# Patient Record
Sex: MALE | Race: OTHER | HISPANIC OR LATINO | URBAN - METROPOLITAN AREA
[De-identification: names, ages, dates, MRNs, and addresses within clinical notes are randomized per-mention and may not be internally consistent; named-entity substitution may affect disease eponyms.]

---

## 2020-06-24 ENCOUNTER — EMERGENCY (EMERGENCY)
Facility: HOSPITAL | Age: 25
LOS: 0 days | Discharge: HOME | End: 2020-06-24
Attending: EMERGENCY MEDICINE | Admitting: EMERGENCY MEDICINE
Payer: SUBSIDIZED

## 2020-06-24 VITALS — SYSTOLIC BLOOD PRESSURE: 144 MMHG | TEMPERATURE: 98 F | DIASTOLIC BLOOD PRESSURE: 73 MMHG | HEART RATE: 76 BPM

## 2020-06-24 VITALS
DIASTOLIC BLOOD PRESSURE: 80 MMHG | OXYGEN SATURATION: 100 % | HEART RATE: 78 BPM | TEMPERATURE: 98 F | SYSTOLIC BLOOD PRESSURE: 125 MMHG | RESPIRATION RATE: 18 BRPM
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
## 2020-06-24 DIAGNOSIS — S61.512A LACERATION WITHOUT FOREIGN BODY OF LEFT WRIST, INITIAL ENCOUNTER: ICD-10-CM

## 2020-06-24 DIAGNOSIS — M25.532 PAIN IN LEFT WRIST: ICD-10-CM

## 2020-06-24 DIAGNOSIS — S99.912A UNSPECIFIED INJURY OF LEFT ANKLE, INITIAL ENCOUNTER: ICD-10-CM

## 2020-06-24 DIAGNOSIS — S41.112A LACERATION WITHOUT FOREIGN BODY OF LEFT UPPER ARM, INITIAL ENCOUNTER: ICD-10-CM

## 2020-06-24 DIAGNOSIS — Z23 ENCOUNTER FOR IMMUNIZATION: ICD-10-CM
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
## 2020-06-24 PROCEDURE — 73610 X-RAY EXAM OF ANKLE: CPT | Mod: 26,LT

## 2020-06-24 PROCEDURE — 99284 EMERGENCY DEPT VISIT MOD MDM: CPT | Mod: 25

## 2020-06-24 PROCEDURE — 73630 X-RAY EXAM OF FOOT: CPT | Mod: 26,LT

## 2020-06-24 PROCEDURE — 72170 X-RAY EXAM OF PELVIS: CPT | Mod: 26

## 2020-06-24 PROCEDURE — 12002 RPR S/N/AX/GEN/TRNK2.6-7.5CM: CPT | Mod: 59

## 2020-06-24 PROCEDURE — 73110 X-RAY EXAM OF WRIST: CPT | Mod: 26,LT

## 2020-06-24 PROCEDURE — 71045 X-RAY EXAM CHEST 1 VIEW: CPT | Mod: 26

## 2020-06-24 PROCEDURE — 29515 APPLICATION SHORT LEG SPLINT: CPT

## 2020-06-24 RX ORDER — TETANUS TOXOID, REDUCED DIPHTHERIA TOXOID AND ACELLULAR PERTUSSIS VACCINE, ADSORBED 5; 2.5; 8; 8; 2.5 [IU]/.5ML; [IU]/.5ML; UG/.5ML; UG/.5ML; UG/.5ML
0.5 SUSPENSION INTRAMUSCULAR ONCE
Refills: 0 | Status: COMPLETED | OUTPATIENT
Start: 2020-06-24 | End: 2020-06-24

## 2020-06-24 RX ADMIN — TETANUS TOXOID, REDUCED DIPHTHERIA TOXOID AND ACELLULAR PERTUSSIS VACCINE, ADSORBED 0.5 MILLILITER(S): 5; 2.5; 8; 8; 2.5 SUSPENSION INTRAMUSCULAR at 09:34

## 2020-06-24 NOTE — ED PROVIDER NOTE - PHYSICAL EXAMINATION
CONST: NAD  EYES: PERRL, EOMI, Sclera and conjunctiva clear.   ENT: No nasal discharge. Oropharynx normal appearing, no erythema or exudates. No abscess or swelling. Uvula midline.   NECK: Non-tender, no meningeal signs. normal ROM. supple   CARD: S1 S2; No jvd  RESP: Equal BS B/L, No wheezes, rhonchi or rales. No distress  GI: Soft, non-tender, non-distended. no cva tenderness. normal BS  MS: L lateral anlke tenderness. Normal ROM in all extremities. pulses 2 +. no calf tenderness or swelling  SKIN: 3cm lac to L dorsal wrist  NEURO: A&Ox3, No focal deficits. Strength 5/5 with no sensory deficits. Steady gait.
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
## 2020-06-24 NOTE — ED PROVIDER NOTE - CARE PROVIDER_API CALL
Malachi Becker  Orthopaedic Surgery  [address] [place] [address]  Phone: [phone]  Fax: [phone]  Follow Up Time:
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
## 2020-06-24 NOTE — ED PROCEDURE NOTE - CPROC ED POST PROC CARE GUIDE1
Instructed patient/caregiver to follow-up with primary care physician./Verbal/written post procedure instructions were given to patient/caregiver./Instructed patient/caregiver regarding signs and symptoms of infection./Elevate the injured extremity as instructed./Keep the cast/splint/dressing clean and dry.
Verbal/written post procedure instructions were given to patient/caregiver./Instructed patient/caregiver regarding signs and symptoms of infection./Instructed patient/caregiver to follow-up with primary care physician./Keep the cast/splint/dressing clean and dry.
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
## 2020-06-24 NOTE — ED ADULT TRIAGE NOTE - NS ED NURSE AMBULANCES
Recurrent ear/sinus infections; Chronic nonallergic rhinitis (nasal/sinus inflammation without allergic cause); Selective IgA deficiency  Plan: Sinus culture was positive for stenotrophomonas 04/2019. Lymphocyte enumeration showed mild elevation of all immune cells (T, B, and NK cells) likely due to response to virus; IgG was moderately elevated, normal IgM, IgA <7; SHARI/ESR were normal/reassuring 03/2019. Sinus X-ray showed maxillary sinus inflammation 03/2019.  8 out of 14 pneumococcal titers 11/2018; Prevnar 13 vaccine administered 04/2012. RAST/SPT were negative to the full peds aeroallergen panel; histamine 2+ 11/2018.  ~ 6 antibiotic courses for ear and sinus infections in the past 6 months.      Has had PE tubes (extruded currently), tonsillectomy, and an adenoidectomy. Persistent/recurrent nasal congestion and dripping.     Last infection 8/2019 requiring antibiotics. Doing well currently.      · Continue daily probiotics.  · Continue Nasacort (triamcinolone) 1 sprays in each nostril once daily.    · Continue clindamycin nasal spray twice daily per ENT.  · Continue nasal saline irrigation (distilled room temperature water mixed with saline packets) once daily.   · Recheck lymphocyte enumeration (immune cell counts) 11/2019.  · Contact us for any infections.     Follow up in 6 month(s)     [facility]
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
## 2020-06-24 NOTE — ED PROVIDER NOTE - PATIENT PORTAL LINK FT
You can access the FollowMyHealth Patient Portal offered by [facility] by registering at the following website: [email or web address]. By joining [employer]’s FollowMyHealth portal, you will also be able to view your health information using other applications (apps) compatible with our system.
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
## 2020-07-06 ENCOUNTER — EMERGENCY (EMERGENCY)
Facility: HOSPITAL | Age: 25
LOS: 0 days | Discharge: HOME | End: 2020-07-06
Attending: EMERGENCY MEDICINE | Admitting: EMERGENCY MEDICINE
Payer: SUBSIDIZED

## 2020-07-06 VITALS
RESPIRATION RATE: 20 BRPM | OXYGEN SATURATION: 99 % | HEART RATE: 83 BPM | DIASTOLIC BLOOD PRESSURE: 68 MMHG | SYSTOLIC BLOOD PRESSURE: 134 MMHG | WEIGHT: 259.93 LBS | TEMPERATURE: 98 F
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
## 2020-07-06 DIAGNOSIS — Z48.02 ENCOUNTER FOR REMOVAL OF SUTURES: ICD-10-CM

## 2020-07-06 DIAGNOSIS — S61.412D LACERATION WITHOUT FOREIGN BODY OF LEFT HAND, SUBSEQUENT ENCOUNTER: ICD-10-CM

## 2020-07-06 PROCEDURE — L9995: CPT
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
## 2020-07-06 NOTE — ED PROVIDER NOTE - PATIENT PORTAL LINK FT
You can access the FollowMyHealth Patient Portal offered by [facility] by registering at the following website: [email or web address]. By joining [employer]’s FollowMyHealth portal, you will also be able to view your health information using other applications (apps) compatible with our system.

## 2020-07-06 NOTE — ED PROVIDER NOTE - NS ED ROS FT
Review of Systems:  CONSTITUTIONAL - No fever  SKIN - No rash  HEMATOLOGIC - No abnormal bleeding or bruising  RESPIRATORY - No shortness of breath, No cough  CARDIAC -No chest pain, No palpitations  GI - No abdominal pain, No nausea, No vomiting, No diarrhea  MUSCULOSKELETAL - No joint paint, No swelling, No back pain  NEUROLOGIC - No numbness, No focal weakness, No headache, No dizziness  All other systems negative, unless specified in HPI
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
## 2020-07-06 NOTE — ED PROVIDER NOTE - NSFOLLOWUPCLINICS_GEN_ALL_ED_FT
[facility]  Orthpedic  [address] [place], [place]   Phone: [phone]  Fax:   Follow Up Time:
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
## 2020-07-08 PROBLEM — Z00.00 ENCOUNTER FOR PREVENTIVE HEALTH EXAMINATION: Status: ACTIVE | Noted: 2020-07-08

## 2020-07-15 ENCOUNTER — OUTPATIENT (OUTPATIENT)
Dept: OUTPATIENT SERVICES | Facility: HOSPITAL | Age: 25
LOS: 1 days | Discharge: HOME | End: 2020-07-15

## 2020-07-15 ENCOUNTER — APPOINTMENT (OUTPATIENT)
Dept: ORTHOPEDIC SURGERY | Facility: CLINIC | Age: 25
End: 2020-07-15

## 2021-04-19 NOTE — ED PROVIDER NOTE - CHILD ABUSE FACILITY
To talk to our  -  [phone]   Press 0 - to talk to Camryn  8:30 am -5:00 pm  After those hours or if you press 1 or 2 - Central scheduling will answer.  Call  for same day and short notice appointments.    Video Appointments -[facility]  Help Desk for Patients  [phone]     [facility]  [phone]    [facility] - for pap and STI screening if you do not have insurance.     [facility]  [facility] [id number]  Phone: [phone]  [email or web address]    Hours - Monday & Tuesdays 6:00 pm - 8:00 pm   Clinic opens at 6:00 pm and operates on first -come, first-served basis. Last patient seen is at 7:30 pm   No appointments are necessary and all services are free; call with questions.         [facility]  [address] [facility] Phone: [phone]             [facility]

## 2021-07-12 ENCOUNTER — EMERGENCY (EMERGENCY)
Facility: HOSPITAL | Age: 26
LOS: 0 days | Discharge: HOME | End: 2021-07-12
Attending: EMERGENCY MEDICINE | Admitting: EMERGENCY MEDICINE
Payer: SELF-PAY

## 2021-07-12 VITALS
DIASTOLIC BLOOD PRESSURE: 68 MMHG | OXYGEN SATURATION: 100 % | WEIGHT: 250 LBS | RESPIRATION RATE: 16 BRPM | HEART RATE: 69 BPM | TEMPERATURE: 98 F | SYSTOLIC BLOOD PRESSURE: 122 MMHG
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
## 2021-07-12 DIAGNOSIS — M25.572 PAIN IN LEFT ANKLE AND JOINTS OF LEFT FOOT: ICD-10-CM

## 2021-07-12 DIAGNOSIS — M25.571 PAIN IN RIGHT ANKLE AND JOINTS OF RIGHT FOOT: ICD-10-CM

## 2021-07-12 DIAGNOSIS — S46.912A STRAIN OF UNSPECIFIED MUSCLE, FASCIA AND TENDON AT SHOULDER AND UPPER ARM LEVEL, LEFT ARM, INITIAL ENCOUNTER: ICD-10-CM

## 2021-07-12 DIAGNOSIS — S93.401A SPRAIN OF UNSPECIFIED LIGAMENT OF RIGHT ANKLE, INITIAL ENCOUNTER: ICD-10-CM
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
## 2021-07-12 DIAGNOSIS — S93.402A SPRAIN OF UNSPECIFIED LIGAMENT OF LEFT ANKLE, INITIAL ENCOUNTER: ICD-10-CM

## 2021-07-12 DIAGNOSIS — M25.471 EFFUSION, RIGHT ANKLE: ICD-10-CM
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
## 2021-07-12 PROCEDURE — 29515 APPLICATION SHORT LEG SPLINT: CPT

## 2021-07-12 PROCEDURE — 73030 X-RAY EXAM OF SHOULDER: CPT | Mod: 26,LT

## 2021-07-12 PROCEDURE — 99284 EMERGENCY DEPT VISIT MOD MDM: CPT | Mod: 25

## 2021-07-12 PROCEDURE — 73562 X-RAY EXAM OF KNEE 3: CPT | Mod: 26,50

## 2021-07-12 PROCEDURE — 73610 X-RAY EXAM OF ANKLE: CPT | Mod: 26,50

## 2021-07-12 NOTE — ED PROVIDER NOTE - PATIENT PORTAL LINK FT
You can access the FollowMyHealth Patient Portal offered by [facility] by registering at the following website: [email or web address]. By joining [employer]’s FollowMyHealth portal, you will also be able to view your health information using other applications (apps) compatible with our system.

## 2021-07-12 NOTE — ED PROVIDER NOTE - CARE PLAN
Principal Discharge DX:	Ankle sprain  Secondary Diagnosis:	Shoulder strain, left, initial encounter

## 2021-07-12 NOTE — ED PROVIDER NOTE - NSFOLLOWUPINSTRUCTIONS_ED_ALL_ED_FT
Ankle Sprain    A sprain is a stretch or tear in one of the tough, fiber-like tissues (ligaments) in your body. This is caused by an injury to the area such as a twisting mechanism. Symptoms include pain, swelling, or bruising. Rest that area over the next several days and slowly resume activity when tolerated. Ice can help with swelling and pain.     SEEK IMMEDIATE MEDICAL CARE IF YOU HAVE THE FOLLOWING SYMPTOMS: worsening pain, inability to move that body part, numbness or tingling.     Shoulder Sprain/Strain    A shoulder sprain is a partial or complete tear in one of the tough, fiber-like tissues (ligaments) in the shoulder. The ligaments in the shoulder help to hold the shoulder in place.    What are the causes?  This condition may be caused by:    A fall.  A hit to the shoulder.  A twist of the arm.    What increases the risk?  This condition is more likely to develop in:    People who play sports.  People who have problems with balance or coordination.    What are the signs or symptoms?  Symptoms of this condition include:    Pain when moving the shoulder.  Limited ability to move the shoulder.  Swelling and tenderness on top of the shoulder.  Warmth in the shoulder.  A change in the shape of the shoulder.  Redness or bruising on the shoulder.    How is this diagnosed?  This condition is diagnosed with a physical exam. During the exam, you may be asked to do simple exercises with your shoulder. You may also have imaging tests, such as X-rays, MRI, or a CT scan. These tests can show how severe the sprain is.    How is this treated?  This condition may be treated with:    Rest.  Pain medicine.  Ice.  A sling or brace. This is used to keep the arm still while the shoulder is healing.  Physical therapy or rehabilitation exercises. These help to improve the range of motion and strength of the shoulder.  Surgery (rare). Surgery may be needed if the sprain caused a joint to become unstable. Surgery may also be needed to reduce pain.    Some people may develop ongoing shoulder pain or lose some range of motion in the shoulder. However, most people do not develop long-term problems.    Follow these instructions at home:  ImageRest.  Ask your health care provider when it is safe for you to drive if you have a sling or brace on your shoulder.  Take over-the-counter and prescription medicines only as told by your health care provider.  If directed, apply ice to the area:    Put ice in a plastic bag.  Place a towel between your skin and the bag.  Leave the ice on for 20 minutes, 2–3 times per day.    If you were given a shoulder sling or brace:    Wear it as told.  Remove it to shower or bathe.  Move your arm only as much as told by your health care provider, but keep your hand moving to prevent swelling.    If you were shown how to do any exercises, do them as told by your health care provider.  Keep all follow-up visits as told by your health care provider. This is important.  Contact a health care provider if:  Your pain gets worse.  Your pain is not relieved with medicines.  You have increased redness or swelling.  Get help right away if:  You have a fever.  You cannot move your arm or shoulder.  You develop severe numbness or tingling in your arm, hand, or fingers.  Your arm, hand, or fingers turn blue, white, or gray and feel cold.  This information is not intended to replace advice given to you by your health care provider. Make sure you discuss any questions you have with your health care provider.
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
## 2021-07-12 NOTE — ED PROVIDER NOTE - PHYSICAL EXAMINATION
CONSTITUTIONAL: Well-appearing; well-nourished; in no apparent distress.   CARDIOVASCULAR: Normal S1, S2; no murmurs, rubs, or gallops.   RESPIRATORY: Normal chest excursion with respiration; breath sounds clear and equal bilaterally; no wheezes, rhonchi, or rales.  GI/: Normal bowel sounds; non-distended; non-tender; no palpable organomegaly.   MS: + + tenderness and swelling to lateral malleoli of b/l ankle. right > left 2+ DP pulses b/l.  non-tender to b/l feet/knee. left leg N/V intact. no midline tenderness to neck/back.   SKIN: Normal for age and race; warm; dry; good turgor; no apparent lesions or exudate.   NEURO/PSYCH: A & O x 4; grossly unremarkable.
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
## 2021-07-12 NOTE — ED PROVIDER NOTE - CARE PROVIDER_API CALL
Malachi Becker (MD)  Orthopaedic Surgery  [address] [place] [address]  Phone: [phone]  Fax: [phone]  Follow Up Time:

## 2021-07-13 PROBLEM — Z78.9 OTHER SPECIFIED HEALTH STATUS: Chronic | Status: ACTIVE | Noted: 2020-07-06
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
## 2023-07-17 NOTE — ED ADULT NURSE NOTE - DOES PATIENT HAVE ADVANCE DIRECTIVE
Add Lotemax SM x 1 month and Cequa ongoing. Follow for now. Monitor. Patient made aware of 24/7 emergency services. Recommended artificial tears to use: 1 drop 4x a day in both eyes. Body Location Override (Optional - Billing Will Still Be Based On Selected Body Map Location If Applicable): right posterior ear Detail Level: Detailed Add 40605 Cpt? (Important Note: In 2017 The Use Of 85177 Is Being Tracked By Cms To Determine Future Global Period Reimbursement For Global Periods): yes No

## 2024-12-20 NOTE — ED PROVIDER NOTE - NS ED ROS FT
Jose Alberto Lorenzo presents to the clinic today for a weight and blood pressure check with the nurse for Phentermine.     He doing well on the phentermine. He is taking a full tablet of phentermine. He denies tachycardia and heart palpations.     He states he is still Zepbound and would like to increase the dose. Advised him that Yola sent the 5 mg dose of Zepbound to [facility] pharmacy on 12/16/2024 and to contact them regarding the refill and a sharps container.     He states he used to have a bm every morning with his coffee but is struggling a bit with constipation. He is taking Miralax which is helping him keep his bms soft. We discussed taking Miralax daily and since he isn't eating as much as he used to and being on Zepbound his bms can slow down, he verbalized understanding and stated that's what I was told. Advised him if the constipation gets worse he needs to contact our office. He states he is drinking plenty of water, at least 4-5 Big Gulps a day. Advised him he should be getting 64 ounces of water a day and can increase the water intake. He states he is starting to feel hungry again. We discussed increasing his protein intake.     He has lost a total of 6.8 lbs since he last seen Yola on 11/21/2024.       Visit Vitals  /72 (BP Location: LUE - Left upper extremity, Patient Position: Sitting, Cuff Size: Large Adult)   Pulse 76   Ht 6' 0.21\" (1.834 m)   Wt (!) 144.2 kg (318 lb)   BMI 42.88 kg/m²       Jose Alberto Lorenzo would like a refill of Phentermine.     [email or web address]    Next appointment:   Nurse: 01/20/2025  Yola: 02/20/2025      
Constitutional: no fever, chills, no recent weight loss, change in appetite or malaise  Cardiac: No chest pain, SOB or edema.  Respiratory: No cough or respiratory distress  MS: see HPI  Neuro: No headache or weakness. No LOC.  Skin: No skin rash.  Endocrine: No history of thyroid disease or diabetes.